# Patient Record
Sex: MALE | Race: WHITE | NOT HISPANIC OR LATINO | URBAN - METROPOLITAN AREA
[De-identification: names, ages, dates, MRNs, and addresses within clinical notes are randomized per-mention and may not be internally consistent; named-entity substitution may affect disease eponyms.]

---

## 2018-10-11 ENCOUNTER — OFFICE VISIT - HEALTHEAST (OUTPATIENT)
Dept: FAMILY MEDICINE | Facility: CLINIC | Age: 68
End: 2018-10-11

## 2018-10-11 DIAGNOSIS — R06.02 SOB (SHORTNESS OF BREATH): ICD-10-CM

## 2018-10-11 DIAGNOSIS — E78.5 HYPERLIPIDEMIA LDL GOAL <130: ICD-10-CM

## 2018-10-11 DIAGNOSIS — Z00.00 HEALTH MAINTENANCE EXAMINATION: ICD-10-CM

## 2018-10-11 DIAGNOSIS — Z12.11 SCREEN FOR COLON CANCER: ICD-10-CM

## 2018-10-11 DIAGNOSIS — R97.20 ELEVATED PROSTATE SPECIFIC ANTIGEN (PSA): ICD-10-CM

## 2018-10-11 DIAGNOSIS — G47.33 OSA (OBSTRUCTIVE SLEEP APNEA): ICD-10-CM

## 2018-10-11 DIAGNOSIS — M19.90 ARTHRITIS: ICD-10-CM

## 2018-10-11 DIAGNOSIS — E78.2 MIXED HYPERLIPIDEMIA: ICD-10-CM

## 2018-10-11 DIAGNOSIS — Z23 NEED FOR VACCINATION: ICD-10-CM

## 2018-10-11 LAB
ALBUMIN SERPL-MCNC: 4 G/DL (ref 3.5–5)
ALP SERPL-CCNC: 71 U/L (ref 45–120)
ALT SERPL W P-5'-P-CCNC: 47 U/L (ref 0–45)
ANION GAP SERPL CALCULATED.3IONS-SCNC: 13 MMOL/L (ref 5–18)
AST SERPL W P-5'-P-CCNC: 24 U/L (ref 0–40)
BILIRUB SERPL-MCNC: 0.9 MG/DL (ref 0–1)
BUN SERPL-MCNC: 12 MG/DL (ref 8–22)
CALCIUM SERPL-MCNC: 10 MG/DL (ref 8.5–10.5)
CHLORIDE BLD-SCNC: 104 MMOL/L (ref 98–107)
CHOLEST SERPL-MCNC: 285 MG/DL
CO2 SERPL-SCNC: 23 MMOL/L (ref 22–31)
CREAT SERPL-MCNC: 0.88 MG/DL (ref 0.7–1.3)
FASTING STATUS PATIENT QL REPORTED: YES
GFR SERPL CREATININE-BSD FRML MDRD: >60 ML/MIN/1.73M2
GLUCOSE BLD-MCNC: 91 MG/DL (ref 70–125)
HBA1C MFR BLD: 5.6 % (ref 3.5–6)
HDLC SERPL-MCNC: 35 MG/DL
LDLC SERPL CALC-MCNC: 215 MG/DL
POTASSIUM BLD-SCNC: 4.4 MMOL/L (ref 3.5–5)
PROT SERPL-MCNC: 7.5 G/DL (ref 6–8)
PSA SERPL-MCNC: 3.7 NG/ML (ref 0–4.5)
RHEUMATOID FACT SERPL-ACNC: <15 IU/ML (ref 0–30)
SODIUM SERPL-SCNC: 140 MMOL/L (ref 136–145)
TRIGL SERPL-MCNC: 177 MG/DL

## 2018-10-11 RX ORDER — CHLORAL HYDRATE 500 MG
2 CAPSULE ORAL DAILY
Status: SHIPPED | COMMUNITY
Start: 2018-10-11

## 2018-10-11 RX ORDER — MAGNESIUM 30 MG
30 TABLET ORAL 2 TIMES DAILY
Status: SHIPPED | COMMUNITY
Start: 2018-10-11

## 2018-10-11 ASSESSMENT — MIFFLIN-ST. JEOR: SCORE: 1839.94

## 2018-10-12 ENCOUNTER — COMMUNICATION - HEALTHEAST (OUTPATIENT)
Dept: FAMILY MEDICINE | Facility: CLINIC | Age: 68
End: 2018-10-12

## 2018-10-18 ENCOUNTER — COMMUNICATION - HEALTHEAST (OUTPATIENT)
Dept: SLEEP MEDICINE | Facility: CLINIC | Age: 68
End: 2018-10-18

## 2021-05-30 ENCOUNTER — RECORDS - HEALTHEAST (OUTPATIENT)
Dept: ADMINISTRATIVE | Facility: CLINIC | Age: 71
End: 2021-05-30

## 2021-05-31 ENCOUNTER — RECORDS - HEALTHEAST (OUTPATIENT)
Dept: ADMINISTRATIVE | Facility: CLINIC | Age: 71
End: 2021-05-31

## 2021-06-01 ENCOUNTER — RECORDS - HEALTHEAST (OUTPATIENT)
Dept: ADMINISTRATIVE | Facility: CLINIC | Age: 71
End: 2021-06-01

## 2021-06-02 VITALS — WEIGHT: 231 LBS | HEIGHT: 71 IN | BODY MASS INDEX: 32.34 KG/M2

## 2021-06-09 ENCOUNTER — RECORDS - HEALTHEAST (OUTPATIENT)
Dept: ADMINISTRATIVE | Facility: CLINIC | Age: 71
End: 2021-06-09

## 2021-06-16 PROBLEM — M19.90 ARTHRITIS: Status: ACTIVE | Noted: 2018-10-11

## 2021-06-16 PROBLEM — E78.2 MIXED HYPERLIPIDEMIA: Status: ACTIVE | Noted: 2018-10-11

## 2021-06-16 PROBLEM — G47.33 OSA (OBSTRUCTIVE SLEEP APNEA): Status: ACTIVE | Noted: 2018-10-11

## 2021-06-20 NOTE — PROGRESS NOTES
MALE PREVENTATIVE EXAM    Assessment and Plan:       1. Health maintenance examination  Colon screen up to date per care everywhere. Nonfasting labs today. Discussed PSA risks/benefits, he would like to go ahead with this. Flu shot and pneumovax today, and recommend prevnar in 6-12 months. Check with insurance on shingrix.   - Glycosylated Hemoglobin A1c  - PSA, Annual Screen (Prostatic-Specific Antigen)    2. Hyperlipidemia LDL goal <130  He does not sound open to medication, and previous Care Everywhere notes from 2012 indicate medication noncompliance with statins.   - Lipid Cascade  - Comprehensive Metabolic Panel    3. Arthritis  - Rheumatoid Factor Quant  - XR Hands Bilateral PA VW; Future    4. DAHIANA (obstructive sleep apnea)  Recommend he establish with sleep medicine for DAHIANA and CPAP  - Ambulatory referral to Sleep Medicine    5. SOB (shortness of breath)  Towards end of visit, mentions feeling wheezy or short of breath with exertion. No chest pain. Ongoing for a long time. Start with CXR, and further recommendations based on results.   - XR Chest 2 Views; Future    Next follow up:  No Follow-up on file.    Immunization Review  Adult Imm Review: Due today, orders placed  BMI: obese, advised exercise, healthy diet, weight loss    I discussed the following with the patient:   Adult Healthy Living: Importance of regular exercise  Healthy nutrition    Subjective:   Chief Complaint: Sincere Waddell is an 67 y.o. male here for a preventative health visit.     HPI:  Here for annual exam, establish care visit. Has not seen a provider for a while. DAHIANA, uses CPAP, unclear when he last saw sleep medicine. Long history of osteoarthritis, knees, shoulders, hands, hips, but wonders about RA as father was diagnosed later in life with RA. Bilateral TKAs. Does daily yoga and stretching. Colonoscopy 2015, repeat 10 years (care everywhere). History of hyperlipidemia, side effects from atorvastatin (myalgias) and possibly  "Mevacor as well. Has not been on medication for a long time for this. Previously followed with neurology for headaches, but has not been as symptomatic since adjusting his caffeine intake. Reports coronary calcium score at [facility] in 2016 was \"average risk.\" Taking OTC prostate health supplement.     He thinks he is \"wheezing\" with physical activity. Ongoing for quite some time.  On further questioning, it seems he is using \"wheezing\" as a term to describe shortness of breath. No chest pain. Never smoked. No history of lung disease.     Allergic rhinitis, good control with flonase.     Healthy Habits  Are you taking a daily aspirin? No  Do you typically exercising at least 40 min, 3-4 times per week?  Yes  Do you usually eat at least 4 servings of fruit and vegetables a day, include whole grains and fiber and avoid regularly eating high fat foods? Yes  Have you had an eye exam in the past two years? NO  Do you see a dentist twice per year? Yes  Do you have any concerns regarding sleep? YES sleep apnea    Safety Screen  If you own firearms, are they secured in a locked gun cabinet or with trigger locks? The patient does not own any firearms  Do you feel you are safe where you are living?: Yes (10/11/2018 11:36 AM)  Do you feel you are safe in your relationship(s)?: Yes (10/11/2018 11:36 AM)    Patient Active Problem List    Diagnosis Date Noted     DAIHANA (obstructive sleep apnea) 10/11/2018     Arthritis 10/11/2018     Mixed hyperlipidemia 10/11/2018     Past Medical History:   Diagnosis Date     Scoliosis      Family History   Problem Relation Age of Onset     Valvular heart disease Mother      rheumatic fever     Transient ischemic attack Mother      Rheum arthritis Father      Atrial fibrillation Father      Social History     Social History     Marital status:      Spouse name: N/A     Number of children: N/A     Years of education: N/A     Occupational History     Not on file.     Social History Main Topics " "    Smoking status: Never Smoker     Smokeless tobacco: Never Used     Alcohol use Not on file     Drug use: Not on file     Sexual activity: Not on file     Other Topics Concern     Not on file     Social History Narrative     No narrative on file       Review of Systems:  Please see above.  The rest of the review of systems are negative for all systems.     Cancer Screening       Status Date      COLONOSCOPY Postponed 11/11/2018 Originally 12/14/2002. Waiting for Documentation/Doc Correction              History     Reviewed By Date/Time Sections Reviewed    Soraida Gómez CMA 10/11/2018 11:32 AM Tobacco, Alcohol, Drug Use, Sexual Activity            Objective:   Vital Signs:   Visit Vitals     /90     Resp 16     Ht 5' 11\" (1.803 m)     Wt (!) 231 lb (104.8 kg)     BMI 32.22 kg/m2          PHYSICAL EXAM  GENERAL:  Pleasant, well-appearing man in no acute distress.  VITAL SIGNS:  Reviewed.  HEENT:  Pupils are equal, round, and reactive to light.  Sclerae and  conjunctivae clear.  TMs are clear bilaterally.  Oropharynx is clear with  moist mucous membranes.  NECK:  Supple without lymphadenopathy or thyromegaly.  No carotid bruits.  CARDIOVASCULAR:  Heart regular rate and rhythm without murmur.  Normal S1 and  S2.  LUNGS:  Clear to auscultation bilaterally without wheezes or crackles.  Good  air movement throughout.    ABDOMEN:  Soft, nontender, and nondistended without  guarding or rebound.  No organomegaly.  EXTREMITIES:  Warm and well perfused without edema. Dorsalis pedis pulses easily palpable and symmetric bilaterally.  NEURO: Alert and oriented. Grossly nonfocal.  PSYCHIATRIC: Presents on time and well groomed.  Normal speech and thought content.  Full affect.  No abnormal movements or behaviors noted.   GENITAL/RECTAL: Normal genitalia.  No skin lesions. Normal testicular exam. No inguinal hernia.  Digital rectal exam reveals a normal prostate, smooth, no nodules, no significant enlargement     The " 10-year ASCVD risk score (Zach DYER Jr, et al., 2013) is: 14.5%    Values used to calculate the score:      Age: 67 years      Sex: Male      Is Non- : No      Diabetic: No      Tobacco smoker: No      Systolic Blood Pressure: 106 mmHg      Is BP treated: No      HDL Cholesterol: 35 mg/dL      Total Cholesterol: 285 mg/dL         Medication List          These changes are accurate as of 10/11/18 11:59 PM.  If you have any questions, ask your nurse or doctor.               CONTINUE taking these medications          co-enzyme Q-10 30 mg capsule   INSTRUCTIONS:  Take 30 mg by mouth 3 (three) times a day.           fish oil-omega-3 fatty acids 300-1,000 mg capsule   INSTRUCTIONS:  Take 2 g by mouth daily.           FLONASE NASL   INSTRUCTIONS:  into each nostril.           magnesium 30 mg tablet   INSTRUCTIONS:  Take 30 mg by mouth 2 (two) times a day.                 Additional Screenings Completed Today:

## 2021-08-15 ENCOUNTER — HEALTH MAINTENANCE LETTER (OUTPATIENT)
Age: 71
End: 2021-08-15

## 2021-10-11 ENCOUNTER — HEALTH MAINTENANCE LETTER (OUTPATIENT)
Age: 71
End: 2021-10-11

## 2022-01-31 ENCOUNTER — APPOINTMENT (RX ONLY)
Dept: URBAN - METROPOLITAN AREA CLINIC 153 | Facility: CLINIC | Age: 72
Setting detail: DERMATOLOGY
End: 2022-01-31

## 2022-01-31 DIAGNOSIS — Z71.89 OTHER SPECIFIED COUNSELING: ICD-10-CM

## 2022-01-31 DIAGNOSIS — D22 MELANOCYTIC NEVI: ICD-10-CM

## 2022-01-31 DIAGNOSIS — L81.4 OTHER MELANIN HYPERPIGMENTATION: ICD-10-CM

## 2022-01-31 DIAGNOSIS — L82.1 OTHER SEBORRHEIC KERATOSIS: ICD-10-CM

## 2022-01-31 PROBLEM — D22.5 MELANOCYTIC NEVI OF TRUNK: Status: ACTIVE | Noted: 2022-01-31

## 2022-01-31 PROCEDURE — ? COUNSELING

## 2022-01-31 PROCEDURE — 99203 OFFICE O/P NEW LOW 30 MIN: CPT

## 2022-01-31 ASSESSMENT — LOCATION DETAILED DESCRIPTION DERM
LOCATION DETAILED: LEFT INFERIOR UPPER BACK
LOCATION DETAILED: LEFT MID-UPPER BACK
LOCATION DETAILED: LEFT SUPERIOR MEDIAL MIDBACK
LOCATION DETAILED: RIGHT SUPERIOR PARIETAL SCALP
LOCATION DETAILED: LEFT SUPERIOR UPPER BACK
LOCATION DETAILED: RIGHT MEDIAL FRONTAL SCALP

## 2022-01-31 ASSESSMENT — LOCATION SIMPLE DESCRIPTION DERM
LOCATION SIMPLE: SCALP
LOCATION SIMPLE: LEFT LOWER BACK
LOCATION SIMPLE: RIGHT SCALP
LOCATION SIMPLE: LEFT UPPER BACK

## 2022-01-31 ASSESSMENT — LOCATION ZONE DERM
LOCATION ZONE: SCALP
LOCATION ZONE: TRUNK

## 2022-09-25 ENCOUNTER — HEALTH MAINTENANCE LETTER (OUTPATIENT)
Age: 72
End: 2022-09-25

## 2023-03-29 ENCOUNTER — APPOINTMENT (RX ONLY)
Dept: URBAN - METROPOLITAN AREA CLINIC 150 | Facility: CLINIC | Age: 73
Setting detail: DERMATOLOGY
End: 2023-03-29

## 2023-03-29 DIAGNOSIS — D22 MELANOCYTIC NEVI: ICD-10-CM

## 2023-03-29 DIAGNOSIS — L81.5 LEUKODERMA, NOT ELSEWHERE CLASSIFIED: ICD-10-CM

## 2023-03-29 DIAGNOSIS — L91.8 OTHER HYPERTROPHIC DISORDERS OF THE SKIN: ICD-10-CM

## 2023-03-29 DIAGNOSIS — L82.1 OTHER SEBORRHEIC KERATOSIS: ICD-10-CM

## 2023-03-29 DIAGNOSIS — D18.0 HEMANGIOMA: ICD-10-CM

## 2023-03-29 DIAGNOSIS — L81.4 OTHER MELANIN HYPERPIGMENTATION: ICD-10-CM

## 2023-03-29 DIAGNOSIS — L57.0 ACTINIC KERATOSIS: ICD-10-CM

## 2023-03-29 PROBLEM — D48.5 NEOPLASM OF UNCERTAIN BEHAVIOR OF SKIN: Status: ACTIVE | Noted: 2023-03-29

## 2023-03-29 PROBLEM — D23.5 OTHER BENIGN NEOPLASM OF SKIN OF TRUNK: Status: ACTIVE | Noted: 2023-03-29

## 2023-03-29 PROBLEM — D23.62 OTHER BENIGN NEOPLASM OF SKIN OF LEFT UPPER LIMB, INCLUDING SHOULDER: Status: ACTIVE | Noted: 2023-03-29

## 2023-03-29 PROBLEM — D18.01 HEMANGIOMA OF SKIN AND SUBCUTANEOUS TISSUE: Status: ACTIVE | Noted: 2023-03-29

## 2023-03-29 PROBLEM — D22.5 MELANOCYTIC NEVI OF TRUNK: Status: ACTIVE | Noted: 2023-03-29

## 2023-03-29 PROCEDURE — ? FULL BODY SKIN EXAM

## 2023-03-29 PROCEDURE — 11102 TANGNTL BX SKIN SINGLE LES: CPT

## 2023-03-29 PROCEDURE — ? BIOPSY BY SHAVE METHOD

## 2023-03-29 PROCEDURE — 99213 OFFICE O/P EST LOW 20 MIN: CPT | Mod: 25

## 2023-03-29 PROCEDURE — ? COUNSELING

## 2023-03-29 PROCEDURE — ? LIQUID NITROGEN

## 2023-03-29 PROCEDURE — 17000 DESTRUCT PREMALG LESION: CPT | Mod: 59

## 2023-03-29 PROCEDURE — ? TREATMENT REGIMEN

## 2023-03-29 PROCEDURE — 17003 DESTRUCT PREMALG LES 2-14: CPT

## 2023-03-29 ASSESSMENT — LOCATION SIMPLE DESCRIPTION DERM
LOCATION SIMPLE: RIGHT UPPER BACK
LOCATION SIMPLE: LEFT UPPER ARM
LOCATION SIMPLE: LEFT SHOULDER
LOCATION SIMPLE: LEFT UPPER BACK
LOCATION SIMPLE: RIGHT SHOULDER
LOCATION SIMPLE: LEFT FOREHEAD
LOCATION SIMPLE: RIGHT THIGH
LOCATION SIMPLE: ABDOMEN
LOCATION SIMPLE: LEFT SCALP
LOCATION SIMPLE: UPPER BACK
LOCATION SIMPLE: LEFT HAND
LOCATION SIMPLE: LEFT THIGH
LOCATION SIMPLE: LEFT SMALL FINGER
LOCATION SIMPLE: RIGHT UPPER ARM
LOCATION SIMPLE: CHEST

## 2023-03-29 ASSESSMENT — LOCATION DETAILED DESCRIPTION DERM
LOCATION DETAILED: LEFT MID DORSAL SMALL FINGER
LOCATION DETAILED: LEFT MID-UPPER BACK
LOCATION DETAILED: LEFT LATERAL FRONTAL SCALP
LOCATION DETAILED: LEFT ANTERIOR PROXIMAL UPPER ARM
LOCATION DETAILED: LEFT RADIAL DORSAL HAND
LOCATION DETAILED: RIGHT ANTERIOR DISTAL UPPER ARM
LOCATION DETAILED: SUPERIOR THORACIC SPINE
LOCATION DETAILED: RIGHT ANTERIOR SHOULDER
LOCATION DETAILED: LEFT LATERAL FOREHEAD
LOCATION DETAILED: RIGHT ANTERIOR PROXIMAL UPPER ARM
LOCATION DETAILED: LEFT ANTERIOR PROXIMAL THIGH
LOCATION DETAILED: LEFT MEDIAL INFERIOR CHEST
LOCATION DETAILED: RIGHT INFERIOR UPPER BACK
LOCATION DETAILED: LEFT ANTERIOR SHOULDER
LOCATION DETAILED: LEFT SUPERIOR LATERAL FOREHEAD
LOCATION DETAILED: PERIUMBILICAL SKIN
LOCATION DETAILED: RIGHT ANTERIOR DISTAL THIGH
LOCATION DETAILED: LEFT ANTERIOR DISTAL THIGH

## 2023-03-29 ASSESSMENT — LOCATION ZONE DERM
LOCATION ZONE: FINGER
LOCATION ZONE: ARM
LOCATION ZONE: HAND
LOCATION ZONE: TRUNK
LOCATION ZONE: SCALP
LOCATION ZONE: LEG
LOCATION ZONE: FACE

## 2023-03-29 NOTE — PROCEDURE: LIQUID NITROGEN
Post-Care Instructions: I reviewed with the patient in detail post-care instructions. Patient is to wear sunprotection, and avoid picking at any of the treated lesions. Pt may apply Vaseline to crusted or scabbing areas.
Consent: The patient's consent was obtained including but not limited to risks of crusting, scabbing, blistering, scarring, darker or lighter pigmentary change, recurrence, incomplete removal and infection.
Duration Of Freeze Thaw-Cycle (Seconds): 0
Detail Level: Detailed
Show Applicator Variable?: Yes
Number Of Freeze-Thaw Cycles: 1 freeze-thaw cycle
Render Note In Bullet Format When Appropriate: No

## 2023-10-14 ENCOUNTER — HEALTH MAINTENANCE LETTER (OUTPATIENT)
Age: 73
End: 2023-10-14

## 2024-04-03 ENCOUNTER — APPOINTMENT (RX ONLY)
Dept: URBAN - METROPOLITAN AREA CLINIC 150 | Facility: CLINIC | Age: 74
Setting detail: DERMATOLOGY
End: 2024-04-03

## 2024-04-03 DIAGNOSIS — L82.1 OTHER SEBORRHEIC KERATOSIS: ICD-10-CM

## 2024-04-03 DIAGNOSIS — I83.9 ASYMPTOMATIC VARICOSE VEINS OF LOWER EXTREMITIES: ICD-10-CM

## 2024-04-03 DIAGNOSIS — D22 MELANOCYTIC NEVI: ICD-10-CM

## 2024-04-03 DIAGNOSIS — L81.4 OTHER MELANIN HYPERPIGMENTATION: ICD-10-CM

## 2024-04-03 DIAGNOSIS — D18.0 HEMANGIOMA: ICD-10-CM

## 2024-04-03 DIAGNOSIS — L81.5 LEUKODERMA, NOT ELSEWHERE CLASSIFIED: ICD-10-CM

## 2024-04-03 PROBLEM — D18.01 HEMANGIOMA OF SKIN AND SUBCUTANEOUS TISSUE: Status: ACTIVE | Noted: 2024-04-03

## 2024-04-03 PROBLEM — D23.5 OTHER BENIGN NEOPLASM OF SKIN OF TRUNK: Status: ACTIVE | Noted: 2024-04-03

## 2024-04-03 PROBLEM — D22.5 MELANOCYTIC NEVI OF TRUNK: Status: ACTIVE | Noted: 2024-04-03

## 2024-04-03 PROBLEM — I83.93 ASYMPTOMATIC VARICOSE VEINS OF BILATERAL LOWER EXTREMITIES: Status: ACTIVE | Noted: 2024-04-03

## 2024-04-03 PROBLEM — D23.62 OTHER BENIGN NEOPLASM OF SKIN OF LEFT UPPER LIMB, INCLUDING SHOULDER: Status: ACTIVE | Noted: 2024-04-03

## 2024-04-03 PROCEDURE — 99213 OFFICE O/P EST LOW 20 MIN: CPT

## 2024-04-03 PROCEDURE — ? COUNSELING

## 2024-04-03 PROCEDURE — ? TREATMENT REGIMEN

## 2024-04-03 PROCEDURE — ? FULL BODY SKIN EXAM

## 2024-04-03 ASSESSMENT — LOCATION DETAILED DESCRIPTION DERM
LOCATION DETAILED: SUPRAPUBIC SKIN
LOCATION DETAILED: SUPERIOR THORACIC SPINE
LOCATION DETAILED: LEFT MEDIAL INFERIOR CHEST
LOCATION DETAILED: PERIUMBILICAL SKIN
LOCATION DETAILED: LEFT PROXIMAL POSTERIOR UPPER ARM
LOCATION DETAILED: LEFT PROXIMAL PRETIBIAL REGION
LOCATION DETAILED: LEFT ANTERIOR PROXIMAL UPPER ARM
LOCATION DETAILED: RIGHT PROXIMAL PRETIBIAL REGION
LOCATION DETAILED: LEFT MID-UPPER BACK
LOCATION DETAILED: RIGHT ANTERIOR PROXIMAL UPPER ARM
LOCATION DETAILED: RIGHT PROXIMAL POSTERIOR UPPER ARM
LOCATION DETAILED: RIGHT ANTERIOR SHOULDER
LOCATION DETAILED: LEFT ANTERIOR PROXIMAL THIGH
LOCATION DETAILED: LEFT FOREHEAD
LOCATION DETAILED: RIGHT INFERIOR UPPER BACK

## 2024-04-03 ASSESSMENT — LOCATION SIMPLE DESCRIPTION DERM
LOCATION SIMPLE: RIGHT PRETIBIAL REGION
LOCATION SIMPLE: LEFT THIGH
LOCATION SIMPLE: LEFT UPPER BACK
LOCATION SIMPLE: ABDOMEN
LOCATION SIMPLE: LEFT PRETIBIAL REGION
LOCATION SIMPLE: RIGHT UPPER ARM
LOCATION SIMPLE: UPPER BACK
LOCATION SIMPLE: RIGHT UPPER BACK
LOCATION SIMPLE: GROIN
LOCATION SIMPLE: LEFT UPPER ARM
LOCATION SIMPLE: LEFT FOREHEAD
LOCATION SIMPLE: CHEST
LOCATION SIMPLE: RIGHT SHOULDER

## 2024-04-03 ASSESSMENT — LOCATION ZONE DERM
LOCATION ZONE: LEG
LOCATION ZONE: ARM
LOCATION ZONE: FACE
LOCATION ZONE: TRUNK

## 2025-05-05 ENCOUNTER — APPOINTMENT (OUTPATIENT)
Dept: URBAN - METROPOLITAN AREA CLINIC 153 | Facility: CLINIC | Age: 75
Setting detail: DERMATOLOGY
End: 2025-05-05

## 2025-05-05 DIAGNOSIS — D22 MELANOCYTIC NEVI: ICD-10-CM | Status: STABLE

## 2025-05-05 DIAGNOSIS — L82.1 OTHER SEBORRHEIC KERATOSIS: ICD-10-CM | Status: STABLE

## 2025-05-05 DIAGNOSIS — L81.5 LEUKODERMA, NOT ELSEWHERE CLASSIFIED: ICD-10-CM | Status: STABLE

## 2025-05-05 DIAGNOSIS — L85.3 XEROSIS CUTIS: ICD-10-CM | Status: STABLE

## 2025-05-05 DIAGNOSIS — L57.0 ACTINIC KERATOSIS: ICD-10-CM | Status: INADEQUATELY CONTROLLED

## 2025-05-05 DIAGNOSIS — D18.0 HEMANGIOMA: ICD-10-CM | Status: STABLE

## 2025-05-05 DIAGNOSIS — L81.4 OTHER MELANIN HYPERPIGMENTATION: ICD-10-CM | Status: STABLE

## 2025-05-05 DIAGNOSIS — Z71.89 OTHER SPECIFIED COUNSELING: ICD-10-CM

## 2025-05-05 PROBLEM — D22.61 MELANOCYTIC NEVI OF RIGHT UPPER LIMB, INCLUDING SHOULDER: Status: ACTIVE | Noted: 2025-05-05

## 2025-05-05 PROBLEM — D22.62 MELANOCYTIC NEVI OF LEFT UPPER LIMB, INCLUDING SHOULDER: Status: ACTIVE | Noted: 2025-05-05

## 2025-05-05 PROBLEM — D18.01 HEMANGIOMA OF SKIN AND SUBCUTANEOUS TISSUE: Status: ACTIVE | Noted: 2025-05-05

## 2025-05-05 PROBLEM — D22.5 MELANOCYTIC NEVI OF TRUNK: Status: ACTIVE | Noted: 2025-05-05

## 2025-05-05 PROCEDURE — ? LIQUID NITROGEN

## 2025-05-05 PROCEDURE — ? TREATMENT REGIMEN

## 2025-05-05 PROCEDURE — 99213 OFFICE O/P EST LOW 20 MIN: CPT | Mod: 25

## 2025-05-05 PROCEDURE — 17000 DESTRUCT PREMALG LESION: CPT

## 2025-05-05 PROCEDURE — 17003 DESTRUCT PREMALG LES 2-14: CPT

## 2025-05-05 PROCEDURE — ? COUNSELING

## 2025-05-05 ASSESSMENT — LOCATION DETAILED DESCRIPTION DERM
LOCATION DETAILED: RIGHT ANTERIOR PROXIMAL THIGH
LOCATION DETAILED: LEFT ANTERIOR PROXIMAL THIGH
LOCATION DETAILED: LEFT ANTERIOR DISTAL THIGH
LOCATION DETAILED: MIDDLE STERNUM
LOCATION DETAILED: INFERIOR THORACIC SPINE
LOCATION DETAILED: LEFT PROXIMAL DORSAL FOREARM
LOCATION DETAILED: PERIUMBILICAL SKIN
LOCATION DETAILED: RIGHT MEDIAL FRONTAL SCALP
LOCATION DETAILED: RIGHT PROXIMAL DORSAL FOREARM
LOCATION DETAILED: RIGHT ANTERIOR DISTAL THIGH
LOCATION DETAILED: RIGHT POSTERIOR SHOULDER
LOCATION DETAILED: SUPERIOR MID FOREHEAD
LOCATION DETAILED: LEFT SUPERIOR FRONTAL SCALP
LOCATION DETAILED: LEFT UPPER CUTANEOUS LIP
LOCATION DETAILED: LEFT CENTRAL FRONTAL SCALP
LOCATION DETAILED: LEFT POSTERIOR SHOULDER
LOCATION DETAILED: POSTERIOR MID-PARIETAL SCALP
LOCATION DETAILED: SUPRAPUBIC SKIN

## 2025-05-05 ASSESSMENT — LOCATION ZONE DERM
LOCATION ZONE: LIP
LOCATION ZONE: FACE
LOCATION ZONE: SCALP
LOCATION ZONE: ARM
LOCATION ZONE: TRUNK
LOCATION ZONE: LEG

## 2025-05-05 ASSESSMENT — LOCATION SIMPLE DESCRIPTION DERM
LOCATION SIMPLE: RIGHT SCALP
LOCATION SIMPLE: LEFT SCALP
LOCATION SIMPLE: LEFT FOREARM
LOCATION SIMPLE: CHEST
LOCATION SIMPLE: RIGHT THIGH
LOCATION SIMPLE: GROIN
LOCATION SIMPLE: RIGHT FOREARM
LOCATION SIMPLE: RIGHT SHOULDER
LOCATION SIMPLE: UPPER BACK
LOCATION SIMPLE: SCALP
LOCATION SIMPLE: SUPERIOR FOREHEAD
LOCATION SIMPLE: LEFT THIGH
LOCATION SIMPLE: LEFT SHOULDER
LOCATION SIMPLE: POSTERIOR SCALP
LOCATION SIMPLE: ABDOMEN
LOCATION SIMPLE: LEFT LIP

## 2025-05-05 NOTE — PROCEDURE: LIQUID NITROGEN
Show Applicator Variable?: Yes
Render Note In Bullet Format When Appropriate: No
Duration Of Freeze Thaw-Cycle (Seconds): 3
Post-Care Instructions: I reviewed with the patient in detail post-care instructions. Patient is to wear sun protection, and avoid picking at any of the treated lesions. Pt may apply Vaseline or Aquaphor to crusted or scabbing areas.
Detail Level: Detailed
Number Of Freeze-Thaw Cycles: 1 freeze-thaw cycle
Consent: The patient's consent was obtained including but not limited to risks of crusting, scabbing, blistering, scarring, darker or lighter pigmentary change, recurrence, incomplete removal and infection.